# Patient Record
Sex: FEMALE | Race: WHITE | NOT HISPANIC OR LATINO
[De-identification: names, ages, dates, MRNs, and addresses within clinical notes are randomized per-mention and may not be internally consistent; named-entity substitution may affect disease eponyms.]

---

## 2020-10-20 ENCOUNTER — APPOINTMENT (OUTPATIENT)
Dept: INTERNAL MEDICINE | Facility: CLINIC | Age: 50
End: 2020-10-20
Payer: MEDICARE

## 2020-10-20 VITALS
BODY MASS INDEX: 32.98 KG/M2 | WEIGHT: 168 LBS | HEART RATE: 99 BPM | HEIGHT: 60 IN | OXYGEN SATURATION: 98 % | TEMPERATURE: 97.2 F

## 2020-10-20 VITALS — SYSTOLIC BLOOD PRESSURE: 110 MMHG | DIASTOLIC BLOOD PRESSURE: 70 MMHG

## 2020-10-20 DIAGNOSIS — F17.200 NICOTINE DEPENDENCE, UNSPECIFIED, UNCOMPLICATED: ICD-10-CM

## 2020-10-20 DIAGNOSIS — Z80.3 FAMILY HISTORY OF MALIGNANT NEOPLASM OF BREAST: ICD-10-CM

## 2020-10-20 DIAGNOSIS — Z83.3 FAMILY HISTORY OF DIABETES MELLITUS: ICD-10-CM

## 2020-10-20 DIAGNOSIS — Z82.49 FAMILY HISTORY OF ISCHEMIC HEART DISEASE AND OTHER DISEASES OF THE CIRCULATORY SYSTEM: ICD-10-CM

## 2020-10-20 DIAGNOSIS — Z78.9 OTHER SPECIFIED HEALTH STATUS: ICD-10-CM

## 2020-10-20 PROCEDURE — G0447 BEHAVIOR COUNSEL OBESITY 15M: CPT | Mod: 59

## 2020-10-20 PROCEDURE — 99204 OFFICE O/P NEW MOD 45 MIN: CPT | Mod: 25

## 2020-10-20 PROCEDURE — 36415 COLL VENOUS BLD VENIPUNCTURE: CPT

## 2020-10-20 PROCEDURE — 99406 BEHAV CHNG SMOKING 3-10 MIN: CPT | Mod: 25

## 2020-10-20 NOTE — ASSESSMENT
[FreeTextEntry1] : 50 year old patient presents for CPE\par \par #1 Multiple Sclerosis, referral for Neurologist ( notes appreciated by specialist) \par \par #2 Generalized Anxiety Disorder, ordered Klonopin, referral for mental health counselling, Pt resistant \par to SSRI at this time, does not want to take something everyday, will monitor usage of Klonopin carefully due to\par H/O Substance abuse in past. \par \par #3 Tobacco disorder-   Declined quitting at this time, encouraged to cut down \par \par #4 BMI 32%- Encouraged less fast foods, less snacks, Portion control, less sweets \par \par Health Maintenance: Blood drawn for routine labs, referral for colonoscopy, discussed smoking cessation and weight loss through diet and exercise. Declines mammogram, flu shot, and  COVID antibody testing.\par \par Follow up in 3 months or sooner as needed.\par \par \par \par \par \par \par \par \par \par

## 2020-10-20 NOTE — COUNSELING
[Fall prevention counseling provided] : Fall prevention counseling provided [Use recommended devices] : Use recommended devices [Risk of tobacco use and health benefits of smoking cessation discussed] : Risk of tobacco use and health benefits of smoking cessation discussed [Cessation strategies including cessation program discussed] : Cessation strategies including cessation program discussed [Use of nicotine replacement therapies and other medications discussed] : Use of nicotine replacement therapies and other medications discussed [Encouraged to pick a quit date and identify support needed to quit] : Encouraged to pick a quit date and identify support needed to quit [Tobacco Use Cessation Intermediate Greater Than 3 Minutes Up to 10 Minutes] : Tobacco Use Cessation Intermediate Greater Than 3 Minutes Up to 10 Minutes [Potential consequences of obesity discussed] : Potential consequences of obesity discussed [Benefits of weight loss discussed] : Benefits of weight loss discussed [Structured Weight Management Program suggested:] : Structured weight management program suggested [Encouraged to maintain food diary] : Encouraged to maintain food diary [Encouraged to increase physical activity] : Encouraged to increase physical activity [Encouraged to use exercise tracking device] : Encouraged to use exercise tracking device [Patient Non-adherent to care plan] : Patient non-adherent to care plan [Good understanding] : Patient has a good understanding of lifestyle changes and steps needed to achieve self management goal [Adequate lighting] : Adequate lighting [No throw rugs] : No throw rugs [Use proper foot wear] : Use proper foot wear [Behavioral health counseling provided] : Behavioral health counseling provided [Engage in a relaxing activity] : Engage in a relaxing activity [Sleep ___ hours/day] : Sleep [unfilled] hours/day [Plan in advance] : Plan in advance [Other: ____] : [unfilled] [Willing to Quit Smoking] : Not willing to quit smoking [FreeTextEntry1] : 10 [FreeTextEntry2] : Healthy Diet recommended  [de-identified] : Healthy eating habits/ weight loss

## 2020-10-20 NOTE — REVIEW OF SYSTEMS
[Night Sweats] : night sweats [Constipation] : constipation [Muscle Weakness] : muscle weakness [Unsteady Walking] : ataxia [Anxiety] : anxiety [Negative] : Heme/Lymph [FreeTextEntry9] : left side weakness

## 2020-10-20 NOTE — HEALTH RISK ASSESSMENT
[Fair] : ~his/her~ current health as fair  [Good] : ~his/her~  mood as  good [] : Yes [Monthly or less (1 pt)] : Monthly or less (1 point) [Yes] : Yes [1 or 2 (0 pts)] : 1 or 2 (0 points) [No] : In the past 12 months have you used drugs other than those required for medical reasons? No [Never (0 pts)] : Never (0 points) [Assistive Device] : Patient uses an assistive device [Two or more falls in past year] : Patient reported two or more falls in the past year [0] : 1) Little interest or pleasure doing things: Not at all (0) [HIV test declined] : HIV test declined [Hepatitis C test declined] : Hepatitis C test declined [With Family] : lives with family [On disability] : on disability [# of Members in Household ___] :  household currently consist of [unfilled] member(s) [] :  [Feels Safe at Home] : Feels safe at home [Independent] : managing finances [Smoke Detector] : smoke detector [Reports changes in dental health] : Reports changes in dental health [Reports changes in vision] : Reports changes in vision [Seat Belt] :  uses seat belt [Carbon Monoxide Detector] : carbon monoxide detector [Change in mental status noted] : Change in mental status noted [de-identified] : none [de-identified] : 1/2-3/4 packs per day for 33 years [de-identified] : Opthamalogist,  Dr. Landry [phone]/ Neurology  [Audit-CScore] : 1 [de-identified] : None [de-identified] : Jayme pino [de-identified] : Poor diet. [[id number]] : 0 [Reports changes in hearing] : Reports no changes in hearing [Sunscreen] : does not use sunscreen [MammogramComments] : never had [ColonoscopyComments] : never had

## 2020-10-20 NOTE — HISTORY OF PRESENT ILLNESS
[FreeTextEntry1] : Pt here for CPE/ New Pt /  Declines Flu Vaccine  [de-identified] : 50 year old her for above. Reports having strokelike symptoms a morning in December 2009 with weakness to left side. In 2010 was diagnosed with MS in 2010 and prescribed Betaseron and opiates for neuropathy and chronic pain. Has been out of work as , currently on disability since symptoms began. Had visual changes, optic neuritis, which mostly resolved with use of corrective lenses, followed by ophthalmologist, Dr. Landry. Was being seen by Neurologist, [facility]. No longer seeing this provider for personal reasons. Is searching for a new neurologist and is seeking a referral. Currently has deficits to left side and had 2 falls in past year, uses a cane. Reports Neuropathy to bilateral legs but predominantly left. Reports anxiety around chronic illness and loss of father in 2019.  Report a poor diet of mostly snacks, and not getting a lot of physical activity. Declines flu shot. Not getting health screenings from; dermatologist, GYN, or GI .Reports she can not deal with any more bad news, thus she does not do her Screenings.

## 2020-10-20 NOTE — PHYSICAL EXAM
[No Acute Distress] : no acute distress [Normal Outer Ear/Nose] : the outer ears and nose were normal in appearance [No JVD] : no jugular venous distention [Normal Sclera/Conjunctiva] : normal sclera/conjunctiva [No Respiratory Distress] : no respiratory distress  [Normal Rate] : normal rate  [Declined Breast Exam] : declined breast exam  [No Carotid Bruits] : no carotid bruits [Coordination Grossly Intact] : coordination grossly intact [Normal] : affect was normal and insight and judgment were intact [Alert and Oriented x3] : oriented to person, place, and time [Soft] : abdomen soft [Declined Rectal Exam] : declined rectal exam [Speech Grossly Normal] : speech grossly normal [Memory Grossly Normal] : memory grossly normal [Normal Affect] : the affect was normal [Normal Insight/Judgement] : insight and judgment were intact [Normal Mood] : the mood was normal [de-identified] : obese in stature  [de-identified] : Educated on self Breast Exams [de-identified] : left sided weakness [FreeTextEntry1] : FIT test with instr  [de-identified] : scars/discoloration to Bilateral Arms, Pt hints to IV drug abuse in past/ Difficulty obtaining Venipuncture ( had to use hand )  [de-identified] : unsteady gait [de-identified] : speaks loudly, +Crying on and off

## 2020-10-26 LAB
25(OH)D3 SERPL-MCNC: 21.2 NG/ML
ALBUMIN SERPL ELPH-MCNC: 4.2 G/DL
ALP BLD-CCNC: 109 U/L
ALT SERPL-CCNC: 56 U/L
ANION GAP SERPL CALC-SCNC: 15 MMOL/L
AST SERPL-CCNC: 71 U/L
BASOPHILS # BLD AUTO: 0.08 K/UL
BASOPHILS NFR BLD AUTO: 0.9 %
BILIRUB SERPL-MCNC: 0.3 MG/DL
BUN SERPL-MCNC: 11 MG/DL
CALCIUM SERPL-MCNC: 9.5 MG/DL
CHLORIDE SERPL-SCNC: 101 MMOL/L
CHOLEST SERPL-MCNC: 129 MG/DL
CO2 SERPL-SCNC: 22 MMOL/L
CREAT SERPL-MCNC: 0.67 MG/DL
EOSINOPHIL # BLD AUTO: 0.46 K/UL
EOSINOPHIL NFR BLD AUTO: 5.1 %
ESTIMATED AVERAGE GLUCOSE: 111 MG/DL
GLUCOSE SERPL-MCNC: 90 MG/DL
HBA1C MFR BLD HPLC: 5.5 %
HCT VFR BLD CALC: 44.4 %
HDLC SERPL-MCNC: 43 MG/DL
HGB BLD-MCNC: 14.4 G/DL
IMM GRANULOCYTES NFR BLD AUTO: 0.3 %
LDLC SERPL CALC-MCNC: 64 MG/DL
LYMPHOCYTES # BLD AUTO: 3.35 K/UL
LYMPHOCYTES NFR BLD AUTO: 37.3 %
MAN DIFF?: NORMAL
MCHC RBC-ENTMCNC: 31.9 PG
MCHC RBC-ENTMCNC: 32.4 GM/DL
MCV RBC AUTO: 98.2 FL
MONOCYTES # BLD AUTO: 0.89 K/UL
MONOCYTES NFR BLD AUTO: 9.9 %
NEUTROPHILS # BLD AUTO: 4.18 K/UL
NEUTROPHILS NFR BLD AUTO: 46.5 %
NONHDLC SERPL-MCNC: 86 MG/DL
PLATELET # BLD AUTO: 236 K/UL
POTASSIUM SERPL-SCNC: 4.3 MMOL/L
PROT SERPL-MCNC: 8.1 G/DL
RBC # BLD: 4.52 M/UL
RBC # FLD: 13.2 %
SODIUM SERPL-SCNC: 139 MMOL/L
TRIGL SERPL-MCNC: 111 MG/DL
TSH SERPL-ACNC: 1.44 UIU/ML
WBC # FLD AUTO: 8.99 K/UL

## 2020-10-26 RX ORDER — INTERFERON BETA-1B 0.3 MG
0.3 KIT SUBCUTANEOUS
Refills: 0 | Status: ACTIVE | COMMUNITY

## 2020-11-04 ENCOUNTER — TRANSCRIPTION ENCOUNTER (OUTPATIENT)
Age: 50
End: 2020-11-04

## 2021-05-07 ENCOUNTER — APPOINTMENT (OUTPATIENT)
Dept: INTERNAL MEDICINE | Facility: CLINIC | Age: 51
End: 2021-05-07
Payer: MEDICARE

## 2021-05-07 VITALS
HEART RATE: 98 BPM | OXYGEN SATURATION: 99 % | DIASTOLIC BLOOD PRESSURE: 80 MMHG | HEIGHT: 60 IN | TEMPERATURE: 97.6 F | BODY MASS INDEX: 30.04 KG/M2 | SYSTOLIC BLOOD PRESSURE: 104 MMHG | WEIGHT: 153 LBS

## 2021-05-07 DIAGNOSIS — G62.9 POLYNEUROPATHY, UNSPECIFIED: ICD-10-CM

## 2021-05-07 PROCEDURE — 36415 COLL VENOUS BLD VENIPUNCTURE: CPT

## 2021-05-07 PROCEDURE — 99072 ADDL SUPL MATRL&STAF TM PHE: CPT

## 2021-05-07 PROCEDURE — 99213 OFFICE O/P EST LOW 20 MIN: CPT | Mod: 25

## 2021-05-08 NOTE — PHYSICAL EXAM
[No Acute Distress] : no acute distress [Well Nourished] : well nourished [Normal Sclera/Conjunctiva] : normal sclera/conjunctiva [No JVD] : no jugular venous distention [No Lymphadenopathy] : no lymphadenopathy [No Respiratory Distress] : no respiratory distress  [No Accessory Muscle Use] : no accessory muscle use [Clear to Auscultation] : lungs were clear to auscultation bilaterally [Normal Rate] : normal rate  [Regular Rhythm] : with a regular rhythm [Normal S1, S2] : normal S1 and S2 [No Edema] : there was no peripheral edema [Normal Anterior Cervical Nodes] : no anterior cervical lymphadenopathy [No CVA Tenderness] : no CVA  tenderness [No Joint Swelling] : no joint swelling [No Rash] : no rash [Coordination Grossly Intact] : coordination grossly intact [No Focal Deficits] : no focal deficits [Deep Tendon Reflexes (DTR)] : deep tendon reflexes were 2+ and symmetric [Speech Grossly Normal] : speech grossly normal [Memory Grossly Normal] : memory grossly normal [Normal Affect] : the affect was normal [Alert and Oriented x3] : oriented to person, place, and time [Normal Mood] : the mood was normal [Normal Insight/Judgement] : insight and judgment were intact [de-identified] : overweight  [de-identified] : wearing mask as per guidelines  [de-identified] : slightly unsteady gait noted  [de-identified] : +anxious

## 2021-05-08 NOTE — REVIEW OF SYSTEMS
[Negative] : Heme/Lymph [Muscle Pain] : muscle pain [Anxiety] : anxiety [FreeTextEntry9] : comes and goes

## 2021-05-08 NOTE — ASSESSMENT
[FreeTextEntry1] : 51 yr old presents F/U / med renewals \par \par #1 Anxiety- Will renew Klonopin, discussed again risks/ benefits medication\par ( Last refill 7 months ago #20 tabs only) Use Sparingly \par \par #2 MS- Refer to New Specialist / Neurology , Gabapentin if needed for Neuropathy pain\par \par #3 BMI 29%-  Continue weight loss program, Exercise as much as possible \par Less carbs, Portion control \par \par #4 Tobacco disorder- Pt reports she is too stressed to stop at this time,\par Will try to cut down to 7 cig daily instead of 10 / Daily\par \par labs done in office by MA/ Will call with results :\par \par F/U 5-6 months CPE/ sooner if needed

## 2021-05-08 NOTE — COUNSELING
[Fall prevention counseling provided] : Fall prevention counseling provided [Adequate lighting] : Adequate lighting [No throw rugs] : No throw rugs [Use proper foot wear] : Use proper foot wear [Use recommended devices] : Use recommended devices [Behavioral health counseling provided] : Behavioral health counseling provided [Sleep ___ hours/day] : Sleep [unfilled] hours/day [Engage in a relaxing activity] : Engage in a relaxing activity [Plan in advance] : Plan in advance [Risk of tobacco use and health benefits of smoking cessation discussed] : Risk of tobacco use and health benefits of smoking cessation discussed [Cessation strategies including cessation program discussed] : Cessation strategies including cessation program discussed [Use of nicotine replacement therapies and other medications discussed] : Use of nicotine replacement therapies and other medications discussed [Encouraged to pick a quit date and identify support needed to quit] : Encouraged to pick a quit date and identify support needed to quit [Other: ____] : [unfilled] [FreeTextEntry1] : Assistive device uses sometimes  [Willing to Quit Smoking] : Not willing to quit smoking [FreeTextEntry2] : will cut down

## 2021-05-08 NOTE — HEALTH RISK ASSESSMENT
[] : Yes [No] : In the past 12 months have you used drugs other than those required for medical reasons? No [One fall no injury in past year] : Patient reported one fall in the past year without injury [0] : 1) Little interest or pleasure doing things: Not at all (0) [1] : 2) Feeling down, depressed, or hopeless for several days (1) [de-identified] : No [de-identified] : Neurologist  [de-identified] : walking a little during the day [de-identified] : Poor diet [[id number]] : 1

## 2021-05-08 NOTE — HISTORY OF PRESENT ILLNESS
[FreeTextEntry1] : F/U MS, Anxiety, Neuropathy , Smoker  [de-identified] : 51 yr old presents today for above conditions, +Anxious, talks very fast. 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 Needs Neurologist, saw one Doctor in [place], was not happy with him, given another name today , female doctor in [place]/ Neurology. Has lost some weight. Occasionally will get neuropathic type of pain, Gabapentin has been helpful in the past, will order short term supply. Had fall several months ago, still unsure of herself.

## 2021-05-12 ENCOUNTER — NON-APPOINTMENT (OUTPATIENT)
Age: 51
End: 2021-05-12

## 2021-05-12 DIAGNOSIS — R79.89 OTHER SPECIFIED ABNORMAL FINDINGS OF BLOOD CHEMISTRY: ICD-10-CM

## 2021-05-12 LAB
25(OH)D3 SERPL-MCNC: 14.3 NG/ML
ALBUMIN SERPL ELPH-MCNC: 3.9 G/DL
ALP BLD-CCNC: 114 U/L
ALT SERPL-CCNC: 58 U/L
ANION GAP SERPL CALC-SCNC: 14 MMOL/L
AST SERPL-CCNC: 85 U/L
BASOPHILS # BLD AUTO: 0.09 K/UL
BASOPHILS NFR BLD AUTO: 1 %
BILIRUB SERPL-MCNC: 0.7 MG/DL
BUN SERPL-MCNC: 9 MG/DL
CALCIUM SERPL-MCNC: 9.5 MG/DL
CHLORIDE SERPL-SCNC: 102 MMOL/L
CHOLEST SERPL-MCNC: 115 MG/DL
CO2 SERPL-SCNC: 23 MMOL/L
CREAT SERPL-MCNC: 0.78 MG/DL
EOSINOPHIL # BLD AUTO: 0.49 K/UL
EOSINOPHIL NFR BLD AUTO: 5.5 %
ESTIMATED AVERAGE GLUCOSE: 108 MG/DL
GLUCOSE SERPL-MCNC: 96 MG/DL
HBA1C MFR BLD HPLC: 5.4 %
HCT VFR BLD CALC: 39.3 %
HDLC SERPL-MCNC: 41 MG/DL
HGB BLD-MCNC: 12.9 G/DL
IMM GRANULOCYTES NFR BLD AUTO: 0.3 %
LDLC SERPL CALC-MCNC: 56 MG/DL
LYMPHOCYTES # BLD AUTO: 3.46 K/UL
LYMPHOCYTES NFR BLD AUTO: 38.5 %
MAN DIFF?: NORMAL
MCHC RBC-ENTMCNC: 30.9 PG
MCHC RBC-ENTMCNC: 32.8 GM/DL
MCV RBC AUTO: 94.2 FL
MONOCYTES # BLD AUTO: 0.89 K/UL
MONOCYTES NFR BLD AUTO: 9.9 %
NEUTROPHILS # BLD AUTO: 4.03 K/UL
NEUTROPHILS NFR BLD AUTO: 44.8 %
NONHDLC SERPL-MCNC: 74 MG/DL
PLATELET # BLD AUTO: 259 K/UL
POTASSIUM SERPL-SCNC: 4 MMOL/L
PROT SERPL-MCNC: 8.6 G/DL
RBC # BLD: 4.17 M/UL
RBC # FLD: 13.3 %
SODIUM SERPL-SCNC: 140 MMOL/L
TRIGL SERPL-MCNC: 90 MG/DL
TSH SERPL-ACNC: 1.61 UIU/ML
WBC # FLD AUTO: 8.99 K/UL

## 2021-05-12 RX ORDER — ERGOCALCIFEROL 1.25 MG/1
1.25 MG CAPSULE ORAL
Qty: 12 | Refills: 0 | Status: ACTIVE | COMMUNITY
Start: 2021-05-12 | End: 1900-01-01

## 2021-08-11 ENCOUNTER — RX RENEWAL (OUTPATIENT)
Age: 51
End: 2021-08-11

## 2021-08-11 RX ORDER — ERGOCALCIFEROL 1.25 MG/1
1.25 MG CAPSULE, LIQUID FILLED ORAL
Qty: 12 | Refills: 0 | Status: ACTIVE | COMMUNITY
Start: 2021-08-11 | End: 1900-01-01

## 2022-02-04 ENCOUNTER — APPOINTMENT (OUTPATIENT)
Dept: INTERNAL MEDICINE | Facility: CLINIC | Age: 52
End: 2022-02-04
Payer: MEDICARE

## 2022-02-04 VITALS
WEIGHT: 151 LBS | HEIGHT: 60 IN | TEMPERATURE: 98 F | OXYGEN SATURATION: 95 % | DIASTOLIC BLOOD PRESSURE: 64 MMHG | HEART RATE: 70 BPM | SYSTOLIC BLOOD PRESSURE: 93 MMHG | BODY MASS INDEX: 29.64 KG/M2

## 2022-02-04 DIAGNOSIS — G35 MULTIPLE SCLEROSIS: ICD-10-CM

## 2022-02-04 DIAGNOSIS — F19.11 OTHER PSYCHOACTIVE SUBSTANCE ABUSE, IN REMISSION: ICD-10-CM

## 2022-02-04 DIAGNOSIS — Z23 ENCOUNTER FOR IMMUNIZATION: ICD-10-CM

## 2022-02-04 DIAGNOSIS — Z12.11 ENCOUNTER FOR SCREENING FOR MALIGNANT NEOPLASM OF COLON: ICD-10-CM

## 2022-02-04 DIAGNOSIS — F41.1 GENERALIZED ANXIETY DISORDER: ICD-10-CM

## 2022-02-04 DIAGNOSIS — F41.0 GENERALIZED ANXIETY DISORDER: ICD-10-CM

## 2022-02-04 DIAGNOSIS — Z00.00 ENCOUNTER FOR GENERAL ADULT MEDICAL EXAMINATION W/OUT ABNORMAL FINDINGS: ICD-10-CM

## 2022-02-04 DIAGNOSIS — Z12.39 ENCOUNTER FOR OTHER SCREENING FOR MALIGNANT NEOPLASM OF BREAST: ICD-10-CM

## 2022-02-04 DIAGNOSIS — F17.200 NICOTINE DEPENDENCE, UNSPECIFIED, UNCOMPLICATED: ICD-10-CM

## 2022-02-04 DIAGNOSIS — R74.8 ABNORMAL LEVELS OF OTHER SERUM ENZYMES: ICD-10-CM

## 2022-02-04 PROCEDURE — 36415 COLL VENOUS BLD VENIPUNCTURE: CPT

## 2022-02-04 PROCEDURE — 99396 PREV VISIT EST AGE 40-64: CPT | Mod: 25

## 2022-02-04 RX ORDER — CLONAZEPAM 0.5 MG/1
0.5 TABLET ORAL
Qty: 30 | Refills: 0 | Status: ACTIVE | COMMUNITY
Start: 2020-10-20 | End: 1900-01-01

## 2022-02-04 RX ORDER — GABAPENTIN 300 MG/1
300 CAPSULE ORAL EVERY 8 HOURS
Qty: 30 | Refills: 3 | Status: ACTIVE | COMMUNITY
Start: 2021-05-07 | End: 1900-01-01

## 2022-02-05 NOTE — HEALTH RISK ASSESSMENT
[Good] : ~his/her~  mood as  good [Current] : Current [20 or more] : 20 or more [1 or 2 (0 pts)] : 1 or 2 (0 points) [Never (0 pts)] : Never (0 points) [No] : In the past 12 months have you used drugs other than those required for medical reasons? No [One fall no injury in past year] : Patient reported one fall in the past year without injury [0] : 2) Feeling down, depressed, or hopeless: Not at all (0) [Patient reported PAP Smear was normal] : Patient reported PAP Smear was normal [Handling Complex Tasks] : difficulty handling complex tasks [With Family] : lives with family [On disability] : on disability [] :  [Feels Safe at Home] : Feels safe at home [Reports changes in vision] : Reports changes in vision [Smoke Detector] : smoke detector [Carbon Monoxide Detector] : carbon monoxide detector [Seat Belt] :  uses seat belt [High School] : high school [Fully functional (bathing, dressing, toileting, transferring, walking, feeding)] : Fully functional (bathing, dressing, toileting, transferring, walking, feeding) [Fully functional (using the telephone, shopping, preparing meals, housekeeping, doing laundry, using] : Fully functional and needs no help or supervision to perform IADLs (using the telephone, shopping, preparing meals, housekeeping, doing laundry, using transportation, managing medications and managing finances) [Reports normal functional visual acuity (ie: able to read med bottle)] : Reports normal functional visual acuity [FreeTextEntry1] : no [de-identified] : no [de-identified] : neuro [de-identified] : 3/4 pack daily [Audit-CScore] : 0 [de-identified] : no [de-identified] : Regular (could be better according to pt) [[id number]] : 0 [Change in mental status noted] : No change in mental status noted [Language] : denies difficulty with language [Behavior] : denies difficulty with behavior [Learning/Retaining New Information] : denies difficulty learning/retaining new information [Reasoning] : denies difficulty with reasoning [Spatial Ability and Orientation] : denies difficulty with spatial ability and orientation [Sexually Active] : not sexually active [High Risk Behavior] : no high risk behavior [Reports changes in hearing] : Reports no changes in hearing [Reports changes in dental health] : Reports no changes in dental health [Guns at Home] : no guns at home [Safety elements used in home] : no safety elements used in home [Sunscreen] : does not use sunscreen [Travel to Developing Areas] : does not  travel to developing areas [TB Exposure] : is not being exposed to tuberculosis [Caregiver Concerns] : does not have caregiver concerns [ColonoscopyComments] : Will give referral  [de-identified] : anxiety [de-identified] : with mother

## 2022-02-05 NOTE — ASSESSMENT
[FreeTextEntry1] : 51 yr old presents CPE\par \par MS- Must F/U Neurologist/ Specialist \par \par Anxiety- Renewed Klonopin , discussed again\par risks/ benefits medication and use sparingly only\par \par Neuropathy- Takes Gabapentin as needed \par \par BMI 29%- Continue weight loss program\par Low fats, Low carbs, POrtion control\par \par Health Screenings ordered/ FIT test ordered\par Strongly recommend Baseline Colonoscopy\par \par \par Labs done in office by MA\par \par F/U based on labs\par \par Otherwise 6 month F/U

## 2022-02-05 NOTE — PHYSICAL EXAM
[Normal Appearance] : normal in appearance [No Masses] : no palpable masses [No Nipple Discharge] : no nipple discharge [No Axillary Lymphadenopathy] : no axillary lymphadenopathy [Soft] : abdomen soft [Normal Anterior Cervical Nodes] : no anterior cervical lymphadenopathy [Normal] : affect was normal and insight and judgment were intact [de-identified] : eyelids are unequal / left side slight drooping noted / Chronic  [de-identified] : Jason upper arms veins sclerotic from IV drug use many years ago [de-identified] : Mult scars joel arms from IVDA years ago  [de-identified] : slightly unstaedy gait  [de-identified] : In good spirits / very talkative

## 2022-02-05 NOTE — HISTORY OF PRESENT ILLNESS
[FreeTextEntry1] : CPE [de-identified] : 51 yr old presents today for above, feels well overall. Very busy with brother and his children in her home at this time, takes care of elderly Mother also who is 93 yrs old. Pt doing well overall despite MS Condition. Requests new Neurologist, will give another name/ number. Struggles with Anxiety, resistant to SSRI medication, takes Klonopin rarely, discussed again not for chronic usage. Pt wants to lose weight, but finds it difficult, + Smokes Daily almost pack daily.

## 2022-02-07 ENCOUNTER — APPOINTMENT (OUTPATIENT)
Dept: INTERNAL MEDICINE | Facility: CLINIC | Age: 52
End: 2022-02-07